# Patient Record
Sex: FEMALE | Race: WHITE | NOT HISPANIC OR LATINO | URBAN - METROPOLITAN AREA
[De-identification: names, ages, dates, MRNs, and addresses within clinical notes are randomized per-mention and may not be internally consistent; named-entity substitution may affect disease eponyms.]

---

## 2020-03-30 DIAGNOSIS — Z76.82 ORGAN TRANSPLANT CANDIDATE: Primary | ICD-10-CM

## 2020-04-08 ENCOUNTER — TELEPHONE (OUTPATIENT)
Dept: TRANSPLANT | Facility: CLINIC | Age: 60
End: 2020-04-08

## 2020-06-30 ENCOUNTER — TELEPHONE (OUTPATIENT)
Dept: TRANSPLANT | Facility: CLINIC | Age: 60
End: 2020-06-30

## 2020-07-13 NOTE — PROGRESS NOTES
Spoke to patient to complete her history for upcoming appointment her  will come with her they will be staying at the [facility] and coming in [facility] for her appointment she is aware that she have to bring a small breakfast to eat after blood is drawn she will not need a

## 2020-07-14 ENCOUNTER — OFFICE VISIT (OUTPATIENT)
Dept: TRANSPLANT | Facility: CLINIC | Age: 60
End: 2020-07-14
Payer: MEDICARE

## 2020-07-14 VITALS
HEIGHT: 63 IN | BODY MASS INDEX: 39.69 KG/M2 | HEART RATE: 76 BPM | SYSTOLIC BLOOD PRESSURE: 115 MMHG | TEMPERATURE: 97 F | WEIGHT: 224 LBS | OXYGEN SATURATION: 98 % | DIASTOLIC BLOOD PRESSURE: 60 MMHG | RESPIRATION RATE: 16 BRPM

## 2020-07-14 DIAGNOSIS — N18.6 ESRD ON DIALYSIS: ICD-10-CM

## 2020-07-14 DIAGNOSIS — N18.6 TYPE 2 DIABETES MELLITUS WITH CHRONIC KIDNEY DISEASE ON CHRONIC DIALYSIS, WITH LONG-TERM CURRENT USE OF INSULIN: ICD-10-CM

## 2020-07-14 DIAGNOSIS — I12.9 RENAL HYPERTENSION: ICD-10-CM

## 2020-07-14 DIAGNOSIS — E78.5 HYPERLIPIDEMIA, UNSPECIFIED HYPERLIPIDEMIA TYPE: ICD-10-CM

## 2020-07-14 DIAGNOSIS — Z79.4 TYPE 2 DIABETES MELLITUS WITH CHRONIC KIDNEY DISEASE ON CHRONIC DIALYSIS, WITH LONG-TERM CURRENT USE OF INSULIN: ICD-10-CM

## 2020-07-14 DIAGNOSIS — Z99.2 TYPE 2 DIABETES MELLITUS WITH CHRONIC KIDNEY DISEASE ON CHRONIC DIALYSIS, WITH LONG-TERM CURRENT USE OF INSULIN: ICD-10-CM

## 2020-07-14 DIAGNOSIS — E11.22 TYPE 2 DIABETES MELLITUS WITH CHRONIC KIDNEY DISEASE ON CHRONIC DIALYSIS, WITH LONG-TERM CURRENT USE OF INSULIN: ICD-10-CM

## 2020-07-14 DIAGNOSIS — Z99.2 ESRD ON DIALYSIS: ICD-10-CM

## 2020-07-14 DIAGNOSIS — Z01.818 PRE-TRANSPLANT EVALUATION FOR KIDNEY TRANSPLANT: Primary | ICD-10-CM

## 2020-07-14 PROBLEM — E11.29 DIABETES MELLITUS WITH KIDNEY COMPLICATION: Status: ACTIVE | Noted: 2020-07-14

## 2020-07-14 PROCEDURE — 99204 OFFICE O/P NEW MOD 45 MIN: CPT | Mod: S$PBB,TXP,, | Performed by: NURSE PRACTITIONER

## 2020-07-14 PROCEDURE — 99999 PR PBB SHADOW E&M-EST. PATIENT-LVL V: CPT | Mod: PBBFAC,TXP,, | Performed by: NURSE PRACTITIONER

## 2020-07-14 PROCEDURE — 99204 PR OFFICE/OUTPT VISIT, NEW, LEVL IV, 45-59 MIN: ICD-10-PCS | Mod: S$PBB,TXP,, | Performed by: NURSE PRACTITIONER

## 2020-07-14 PROCEDURE — 99215 OFFICE O/P EST HI 40 MIN: CPT | Mod: PBBFAC,25,TXP | Performed by: NURSE PRACTITIONER

## 2020-07-14 PROCEDURE — 99999 PR PBB SHADOW E&M-EST. PATIENT-LVL V: ICD-10-PCS | Mod: PBBFAC,TXP,, | Performed by: NURSE PRACTITIONER

## 2020-07-14 RX ORDER — ATORVASTATIN CALCIUM 10 MG/1
10 TABLET, FILM COATED ORAL DAILY
COMMUNITY

## 2020-07-14 RX ORDER — FUROSEMIDE 20 MG/1
20 TABLET ORAL 2 TIMES DAILY
COMMUNITY

## 2020-07-14 RX ORDER — CARVEDILOL 12.5 MG/1
12.5 TABLET ORAL 2 TIMES DAILY
COMMUNITY

## 2020-07-14 RX ORDER — GABAPENTIN 100 MG/1
100 CAPSULE ORAL
COMMUNITY

## 2020-07-14 NOTE — PROGRESS NOTES
SW met with pt briefly to dis cuss financially plan, caregiver plan and mental health due to deferral. Pt will be referred.       Do you and your caregivers have access to reliable transportation? yes  PRIMARY CAREGIVER: Dennis Hernandez, (pt's  retired from [place] employment to care for pt) will be primary caregiver, phone number [phone].      provided in-depth information to patient and caregiver regarding pre- and post-transplant caregiver role.   strongly encourages patient and caregiver to have concrete plan regarding post-transplant care giving, including back-up caregiver(s) to ensure care giving needs are met as needed.    Patient and Caregiver states understanding all aspects of caregiver role/commitment and is able/willing/committed to being caregiver to the fullest extent necessary.    Patient and Caregiver verbalizes understanding of the education provided today and caregiver responsibilities.         remains available. Patient and Caregiver agree to contact  in a timely manner if concerns arise.      Able to take time off work without financial concerns: yes.     Additional Significant Others who will Assist with Transplant:  Name: Winsome Zayas  Age: 53  Number: [phone]  City: [place] State: [place]  Relationship: sister  Does person drive? yes    Name: Alfredo Martin   Age: 85  Number: [phone]  City: [place]: [place]  Relationship: father  Does person drive? yes      Monthly Income:  Salary/Wages: $2,751  Able to afford all costs now and if transplanted, including medications: yes, pt's  reports plans to return to work once pt is medically cleared after transplant.   Patient and Caregiver verbalizes understanding of personal responsibilities related to transplant costs and the importance of having a financial plan to ensure that patients transplant costs are fully covered.       provided fundraising  "information/education. Patient and Caregiververbalizes understanding.   remains available.    Insurance:   Payor/Plan Subscr  Sex Relation Sub. Ins. ID Effective Group Num   1. MEDICARE - ME* ZEESHAN SIMENTAL 1960 Female Self [id number] 10/1/19                                    [address]     Primary Insurance (for UNOS reporting): Public Insurance - Medicare FFS (Fee For Service)  Secondary Insurance (for UNOS reporting): None   Patient and Caregiver verbalizes clear understanding that patient may experience difficulty obtaining and/or be denied insurance coverage post-surgery. This includes and is not limited to disability insurance, life insurance, health insurance, burial insurance, long term care insurance, and other insurances.      Patient and Caregiver also reports understanding that future health concerns related to or unrelated to transplantation may not be covered by patient's insurance.  Resources and information provided and reviewed.     Patient and Caregiver provides verbal permission to release any necessary information to outside resources for patient care and discharge planning.  Resources and information provided are reviewed.      Psychiatric History:    Mental Health: anxiety and frustration due to today's decisions of deferment. Pt reports she "will not give up" and will do all she can to improve. SW provided acknowledgement,active listening, encouargement, and support.    Psychiatrist/Counselor: Pt denies currently or in the past and reports willingness to meet with psychiatry if required by transplant.   Medications:  Pt denies utilizing mental health medications currently or in the past  Suicide/Homicide Issues: Pt denies feelings of wanting to harm self or other currently or in the past  Safety at home: Pt reports feeling safe at home.       "

## 2020-07-14 NOTE — LETTER
July 16, 2020        Oscar Padilla  [address] [place] [address]  Phone: [phone]  Fax: [phone]             [facility]  [address]  [facility] [address]  Phone: [phone]   Patient: Christina Hernandez   MR Number: [id number]   YOB: 1960   Date of Visit: 7/14/2020       Dear Dr. Oscar Padilla    Thank you for referring Christina Hernandez to me for evaluation. Attached you will find relevant portions of my assessment and plan of care.    If you have questions, please do not hesitate to call me. I look forward to following Christina Hernandez along with you.    Sincerely,    Vashti Mckinnon, NP    Enclosure    If you would like to receive this communication electronically, please contact [email or web address] or [phone] to request WebLayers Link access.    WebLayers Link is a tool which provides read-only access to select patient information with whom you have a relationship. Its easy to use and provides real time access to review your patients record including encounter summaries, notes, results, and demographic information.    If you feel you have received this communication in error or would no longer like to receive these types of communications, please e-mail [email or web address]

## 2020-07-14 NOTE — PROGRESS NOTES
PHARM.D. PRE-TRANSPLANT NOTE:    This patient's medication therapy was evaluated as part of her pre-transplant evaluation.      The following general pharmacologic concerns were noted: None    The following concerns for post-operative pain management were noted: Patient on gabapentin.    The following pharmacologic concerns related to HCV therapy were noted: None      This patient's medication profile was reviewed for considerations for DAA Hepatitis C therapy:    [X]  No current inducers of CYP 3A4 or PGP  [X]  No amiodarone on this patient's EMR profile in the last 24 months  [X]  No past or current atrial fibrillation on this patient's EMR profile       Current Outpatient Medications   Medication Sig Dispense Refill    atorvastatin (LIPITOR) 10 MG tablet Take 10 mg by mouth once daily.      carvediloL (COREG) 12.5 MG tablet Take 12.5 mg by mouth 2 (two) times daily.      furosemide (LASIX) 20 MG tablet Take 20 mg by mouth 2 (two) times daily.      gabapentin (NEURONTIN) 100 MG capsule Take 100 mg by mouth 6 (six) times daily.      mecobal/levomefolat Ca/B6 phos (METANX ORAL) Take 1 tablet by mouth once daily.      pyridoxal phosphate (PYRIDOXAL-5 PHOSPHATE ORAL) Take 1 tablet by mouth 2 (two) times a day.       No current facility-administered medications for this visit.          Currently the patient is responsible for preparing / administering this patient's medications on a daily basis.  I am available for consultation and can be contacted, as needed by the other members of the Kidney Transplant team.

## 2020-07-14 NOTE — PROGRESS NOTES
Transplant Nephrology  Kidney Transplant Recipient Evaluation    Referring Physician: Oscar Padilla  Current Nephrologist: Oscar Padilla    Subjective:   CC:  Initial evaluation of kidney transplant candidacy.    HPI:  Ms. Hernandez is a 60 y.o. year old White female who has presented to be evaluated as a potential kidney transplant recipient.  She has ESRD secondary to diabetic nephropathy.  Patient is currently on peritoneal dialysis started on 10/2019. Patient is dialyzing on cyclic peritoneal dialysis.  Patient reports that she is tolerating dialysis well. . She has a PD catheter for dialysis access.     Previous Transplant: no  Previous Blood Transfusion: no  Previous Pregnancy: yes  Previous neurogenic bladder/ urine incontinence: none  Anticoagulation/ antiplatelet therapy and reason: none  Potential Donor: yes  High KDPI candidate: no weight  Meets center eligibility for accepting HCV+ donor offer: yes    ESRD:  Dx 2015 with CKD secondary to diabetes  Started PD on 10/2019  Tolerates/BP but has issues with blood sugar control with PD    DM:  DX 1999  Levels at home with blood sugars 105 prior to PD and after could be above 400  gastroproesis none  neuropathy in bilateral feet  Lab Results   Component Value Date    HGBA1C 7.7 (H) 07/14/2020         HTN:  Started treatment in Oct 2019 and controlled   BP Readings from Last 3 Encounters:   07/14/20 115/60       Functional status  Arrived in wheelchair. Patient reports using a cane and walker at home with traveling outside of the home. Reports fall last year along with broken foot. During her foot injury  [place] all house hold activities. At which time patient decreased mobility with inactivity. During this visit patient was able to stand using assistance with the chair. When asked to ambulate to the table patient unable to ambulate even with assistance from  and myself. Patient assisted to seat.       Linda FAJARDO,  strokes, infections/wounds, DVTs/PEs, cancers.        Past Medical History:   Diagnosis Date    Allergy     Arthritis     Right foot and ankles    Asthma     As a Child    Diabetes mellitus     Diabetes mellitus, type 2     Disorder of kidney and ureter     Hyperlipidemia     Hypertension     Miscarriage     Obesity        Past Medical and Surgical History: Ms. Hernandez  has a past medical history of Allergy, Arthritis, Asthma, Diabetes mellitus, Diabetes mellitus, type 2, Disorder of kidney and ureter, Hyperlipidemia, Hypertension, Miscarriage, and Obesity.  She has a past surgical history that includes Breast surgery (Bilateral); Eye surgery (Bilateral); Tonsillectomy; Peritoneal catheter insertion (Bilateral); Closed reduction wrist fracture (Right); and shoulder surgery (2000 & 2001).    Past Social and Family History: Ms. Hernandez reports that she has never smoked. She has never used smokeless tobacco. She reports current alcohol use of about 1.0 standard drinks of alcohol per week. She reports that she does not use drugs. Her family history includes Arthritis in her father; Breast cancer in her mother; Cancer in her mother; Deep vein thrombosis in her father; Diabetes in her mother; Hearing loss in her father; Hypertension in her father; Lung cancer in her mother; No Known Problems in her brother, sister, and sister.    Review of Systems   Constitutional: Positive for fatigue and unexpected weight change. Negative for appetite change, chills and fever.   HENT: Negative for trouble swallowing.    Respiratory: Negative for cough, chest tightness, shortness of breath and wheezing.    Cardiovascular: Positive for leg swelling. Negative for chest pain and palpitations.   Gastrointestinal: Negative for abdominal pain, constipation, diarrhea and nausea.   Genitourinary: Negative for difficulty urinating, frequency and urgency.   Musculoskeletal: Negative for arthralgias and myalgias.   Skin: Negative for  "rash.   Neurological: Negative for dizziness, weakness, light-headedness and headaches.   Psychiatric/Behavioral: Negative for sleep disturbance.       Objective:   Blood pressure 115/60, pulse 76, temperature 96.9 °F (36.1 °C), temperature source Oral, resp. rate 16, height 5' 3.19" (1.605 m), weight 101.6 kg (223 lb 15.8 oz), SpO2 98 %.body mass index is 39.44 kg/m².    Physical Exam  Constitutional:       General: She is not in acute distress.     Appearance: She is well-developed. She is not diaphoretic.   Cardiovascular:      Rate and Rhythm: Normal rate and regular rhythm.      Heart sounds: Normal heart sounds. No murmur. No friction rub. No gallop.    Pulmonary:      Effort: Pulmonary effort is normal. No respiratory distress.      Breath sounds: Normal breath sounds. No wheezing or rales.   Abdominal:      General: Bowel sounds are normal. There is no distension.      Palpations: Abdomen is soft.      Tenderness: There is no abdominal tenderness.   Musculoskeletal: Normal range of motion.         General: No tenderness.   Skin:     General: Skin is warm and dry.      Findings: No rash.      Nails: There is no clubbing.            Neurological:      Mental Status: She is alert and oriented to person, place, and time.   Psychiatric:         Behavior: Behavior normal.         Labs:  Lab Results   Component Value Date    WBC 12.91 (H) 07/14/2020    HGB 10.7 (L) 07/14/2020    HCT 34.4 (L) 07/14/2020     07/14/2020    K 3.6 07/14/2020     07/14/2020    CO2 27 07/14/2020    BUN 49 (H) 07/14/2020    CREATININE 5.4 (H) 07/14/2020    EGFRNONAA 7.9 (A) 07/14/2020    CALCIUM 8.3 (L) 07/14/2020    PHOS 5.0 (H) 07/14/2020    ALBUMIN 2.6 (L) 07/14/2020    AST 20 07/14/2020    ALT 19 07/14/2020    .0 (H) 07/14/2020   No results found for: PREALBUMIN, BILIRUBINUA, GGT, AMYLASE, LIPASE, PROTEINUA, NITRITE, RBCUA, WBCUA    Lab Results   Component Value Date    HGBA1C 7.7 (H) 07/14/2020       No results " found for: HLAABCTYPE    Labs were reviewed with the patient.    Assessment:     1. Pre-transplant evaluation for kidney transplant    2. ESRD on dialysis    3. Renal hypertension    4. Type 2 diabetes mellitus with chronic kidney disease on chronic dialysis, with long-term current use of insulin    5. Hyperlipidemia, unspecified hyperlipidemia type        Plan:   Deferred for now.   High frailty index  Can be re-referred once mobility improves (recommending physical therapy)  BMI 39.44 with large centralized abd obesity. Encouraged to loss 30lbs.         Transplant Candidacy:   Based on available information, Ms. Hernandez is an unacceptable kidney transplant candidate.   Meets center eligibility for accepting HCV+ donor offer - yes.  Patient educated on HCV+ donors. Christina is willing to accept HCV+ donor offer - yes   Patient is a candidate for KDPI > 85 kidney donor offer - no. weight  Final determination of transplant candidacy will be made once workup is complete and reviewed by the selection committee.    GIULIANO JorgeOS Patient Status  Functional Status: 60% - Requires occasional assistance but is able to care for needs  Physical Capacity: Limited Mobility

## 2020-07-17 ENCOUNTER — COMMITTEE REVIEW (OUTPATIENT)
Dept: TRANSPLANT | Facility: CLINIC | Age: 60
End: 2020-07-17

## 2020-07-17 ENCOUNTER — DOCUMENTATION ONLY (OUTPATIENT)
Dept: TRANSPLANT | Facility: CLINIC | Age: 60
End: 2020-07-17

## 2020-07-17 NOTE — NURSING
Results reviewed, action needed.  Fax labs to dialysis/nephrologist concerning  Mild leukocytosis    Faxed labs to dialysis unit and referring MD per the request of ALEXUS Rosales NP.

## 2020-07-17 NOTE — LETTER
July 17, 2020    Christina Hernandez  [address] [place] [address]    Dear Christinafausto Hernandez:  MRN: [id number]    It is the duty of the [facility] Selection Committee to determine which patients are candidates for a transplant. For this reason, our committee has the difficult task of evaluating patients to determine which ones have the greatest chance of having a successful transplant. We are aware of the magnitude of this responsibility, and we approach it with reverence and humility.    It is with regret I inform you that you are not approved as a transplant candidate due to generalized weakness  and a body mass index (BMI) of 39.44. You may be re-referred for kidney transplant evaluation once your mobility improves, physical therapy is recommened and you lose 30 pounds with a weight goal of 193 pounds.  Based on this review, we have determined that at this time, you are not a candidate for a transplant at [facility].      The selection committee carefully considers each patient's transplant candidacy and determines whether it is safe to proceed with transplantation on a case-by-case basis using established selection criteria.  At present, the risk of proceeding with an elective transplant surgery has become too high.                                                                               Although the selection committee believes you are not a suitable transplant candidate, you have the option to be evaluated at other transplant centers who may have different selection criteria.  You may request your [facility] records be sent to any center of your choice by contacting our Medical Records Department at [phone].                                                                               Attached is a letter from the United Network for Organ Sharing (UNOS).  It describes the services and information offered to patients by UNOS and the Organ Procurement and Transplant Network.    The [facility]  K[facility] Selection Committee sincerely wishes you the best and remains available to answer any questions.  Please do not hesitate to contact our pre-transplant office if we can assist you in any other way.                                                                               Sincerely,    Huong Flaherty MD  Medical Director, Kidney & Kidney/Pancreas Transplantation    tj/Enclosed    Cc: [facility]    Encl: UNOS Letter             The Organ Procurement and Transplantation Network   Toll-free patient services line: Your resource for organ transplant information     If you have a question regarding your own medical care, you always should call your transplant hospital first. However, for general organ transplant-related information, you can call the Organ Procurement and Transplantation Network (OPTN) toll-free patient services line at 1-112.770.1545.     Anyone, including potential transplant candidates, candidates, recipients, family members, friends, living donors, and donor family members, can call this number to:     · Talk about organ donation, living donation, the transplant process, the donation process, and transplant policies.   · Get a free patient information kit with helpful booklets, waiting list and transplant information, and a list of all transplant hospitals.   · Ask questions about the OPTN website (https://optn.transplant.hrsa.gov/), the United Network for Organ Sharings (UNOS) website (https://unos.org/), or the UNOS website for living donors and transplant recipients. (https://www.transplantliving.org/).   · Learn how the OPTN can help you.   · Talk about any concerns that you may have with a transplant hospital.     The nations transplant system, the OPTN, is managed under federal contract by the [employer] Network for Organ Sharing (UNOS), which is a non-profit charitable organization. The OPTN helps create and define organ sharing policies that make the best use of  donated organs. This process continuously evaluating new advances and discoveries so policies can be adapted to best serve patients waiting for transplants. To do so, the OPTN works closely with transplant professionals, transplant patients, transplant candidates, donor families, living donors, and the public. All transplant programs and organ procurement organizations throughout the country are OPTN members and are obligated to follow the policies the OPTN creates for allocating organs.     The OPTN also is responsible for:   · Providing educational material for patients, the public, and professionals.   · Raising awareness of the need for donated organs and tissue.   · Coordinating organ procurement, matching, and placement.   · Collecting information about every organ transplant and donation that occurs in the [place].     Remember, you should contact your transplant hospital directly if you have questions or concerns about your own medical care including medical records, work-up progress, and test results.     We are not your transplant hospital, and our staff will not be able to answer questions about your case, so please keep your transplant hospitals phone number handy.   However, while you research your transplant needs and learn as much as you can about transplantation and donation, we welcome your call to our toll-free patient services line at [phone].

## 2020-07-17 NOTE — COMMITTEE REVIEW
Native Organ Dx: Diabetes Mellitus - Type II      Not approved for LRD/CAD transplant due to generalized weakness, and BMI of 39.44. Patient may be re-referred for kidney transplant evaluation once mobility improves, physical therapy is recommened and patient loses 30 pounds.       Note written by: Michelle Abadie, RN    ===============================================    I was present at the meeting and attest to the decision of the committee.